# Patient Record
Sex: MALE | Race: OTHER | Employment: UNEMPLOYED | URBAN - METROPOLITAN AREA
[De-identification: names, ages, dates, MRNs, and addresses within clinical notes are randomized per-mention and may not be internally consistent; named-entity substitution may affect disease eponyms.]

---

## 2017-01-02 ENCOUNTER — OFFICE VISIT (OUTPATIENT)
Dept: FAMILY MEDICINE CLINIC | Age: 42
End: 2017-01-02

## 2017-01-02 VITALS
WEIGHT: 159 LBS | TEMPERATURE: 97.5 F | DIASTOLIC BLOOD PRESSURE: 64 MMHG | HEART RATE: 57 BPM | SYSTOLIC BLOOD PRESSURE: 110 MMHG | BODY MASS INDEX: 24.24 KG/M2

## 2017-01-02 DIAGNOSIS — H60.93 OTITIS EXTERNA OF BOTH EARS, UNSPECIFIED CHRONICITY, UNSPECIFIED TYPE: Primary | ICD-10-CM

## 2017-01-02 RX ORDER — NEOMYCIN SULFATE, POLYMYXIN B SULFATE AND DEXAMETHASONE 3.5; 10000; 1 MG/ML; [USP'U]/ML; MG/ML
SUSPENSION/ DROPS OPHTHALMIC
Qty: 5 ML | Refills: 1 | Status: SHIPPED | OUTPATIENT
Start: 2017-01-02 | End: 2022-03-17

## 2017-01-02 NOTE — PATIENT INSTRUCTIONS
Oído de nadador: Instrucciones de cuidado - [ Swimmer's Ear: Care Instructions ]  Instrucciones de cuidado    El oído de nadador (otitis externa) es gita inflamación o infección del canal auditivo. Gris es el conducto que va del oído externo hasta el tímpano. Cualquier residuo de [place], arena u otros que entren al canal [place] y permanezcan allí pueden causar oído de nadador. Introducirse hisopos de algodón u otros objetos en el oído para limpiarlo también puede causar gris problema. El oído de nadador puede ser [employer]. Sherine se puede tratar [employer] y la infección con medicamentos. Usted debería sentirse mejor en algunos días. La atención de seguimiento es gita parte clave de sahu tratamiento y seguridad. Asegúrese de hacer y acudir a todas las citas, y llame a sahu médico si está teniendo problemas. También es gita buena idea saber los resultados de los exámenes y mantener gita lista de los medicamentos que yosvany. ¿Cómo puede cuidarse en el hogar? Limpieza y cuidado  · Aplíquese las gotas antibióticas karen se lo indique el médico.  · No utilice gotas para los oídos (que no mumtaz gotas antibióticas) ni ninguna otra cosa dentro de los oídos a menos que sahu médico se lo haya indicado. · Evite que le entre agua en el oído hasta que pase el Grand-Leez. Utilice un algodón cubierto con un poco de vaselina karen tapón. No use tapones de plástico.  · Utilice un secador de pelo a baja temperatura para secarse el oído con cuidado después de ducharse. · Para aliviar el dolor, póngase gita toallita tibia [place]. · [employer] analgésicos (medicamentos para el dolor) exactamente según las indicaciones. ¨ Si el médico le recetó un analgésico, tómelo según las indicaciones. ¨ Si no está tomando un analgésico recetado, pregúntele a sahu médico si puede doreen fabrice de [employer]. Aplicación de gotas para los oídos  · [employer] gotas a la temperatura corporal haciendo rodar el recipiente [employer].  O puede ponerlo en gita taza de agua tibia mel algunos minutos. · Acuéstese, con el oído [place]. · Póngase gotas dentro del oído. Siga las instrucciones de sahu médico (o las indicaciones de la etiqueta) para saber cuántas gotas usar. Mueva sahu oreja con suavidad o tire de víctor [place] y [place] atrás para ayudar a que las gotas entren en el oído. · Es importante mantener el líquido en el canal auditivo por entre 3 y 5 minutos. ¿Cuándo debe pedir ayuda? Llame a sahu médico ahora mismo o busque atención médica inmediata si:  · Tiene fiebre nueva o más izzy. · Tiene nuevo o mayor dolor, hinchazón, calor o enrojecimiento alrededor o detrás de la [facility]. · Sahu oído presenta secreción de pus o valery nueva o que está aumentando. Preste especial atención a los cambios en sahu shaw y asegúrese de comunicarse con sahu médico si:  · No está mejorando después de 2 días (48 horas). ¿Dónde puede encontrar más información en inglés? Jeny Sousa a [email or web address]-fahad.info/. Cleve Camacho O924 en la búsqueda para aprender más acerca de \"Oído de nadador: Instrucciones de cuidado - [ Swimmer's Ear: Care Instructions ]. \"  Revisado: 29 julio, 2016  Versión del contenido: 11.1  © 7757-9298 Healthwise, Incorporated. Las instrucciones de cuidado fueron adaptadas bajo licencia por [employer] (which disclaims liability or warranty for this information). Si usted tiene [place] afección médica o sobre estas instrucciones, siempre pregunte a sahu profesional de shaw. Healthwise, Incorporated niega toda garantía o responsabilidad por sahu uso de esta información.

## 2017-01-02 NOTE — PROGRESS NOTES
Coordination of Care  1. Have you been to the ER, urgent care clinic since your last visit? Hospitalized since your last visit? No    2. Have you seen or consulted any other health care providers outside of the [address] [place] [address] since your last visit? Include any pap smears or colon screening. No    Medications  Medication Reconciliation Performed: no  Patient does not need refills     Learning Assessment Complete?  yes

## 2017-01-02 NOTE — MR AVS SNAPSHOT
Visit Information Martha Wren y Esther Personal Médico Departamento Teléfono del Dep. Número de visita 1/2/2017 11:00 AM [address], SID [place] [phone] Follow-up Instructions Return if symptoms worsen or fail to improve. Upcoming Health Maintenance Date Due DTaP/Tdap/Td series (1 - Tdap) 8/12/1997 Alergias  Review Complete El: 1/2/2017 Por: Salud Langford A partir del:  1/2/2017 No Known Allergies Vacunas actuales Revisadas el:  2/20/2016 Flakito Colin Influenza Vaccine (Quad) PF 12/15/2016, 2/20/2016 No revisadas esta visita You Were Diagnosed With   
  
 Glennette Faby Otitis externa of both ears, unspecified chronicity, unspecified type    -  Primary ICD-10-CM: H60.93 
ICD-9-CM: 380.10 Partes vitales PS Pulso Temperatura Peso (percentil de crecimiento) BMI ([facility]) Estatus de tabaquísmo 110/64 (BP 1 Location: Right arm, BP Patient Position: Sitting) (!) 57 97.5 °F (36.4 °C) (Oral) 159 lb (72.1 kg) 24.24 kg/m2 Never Smoker Historial de signos vitales BMI and BSA Data Body Mass Index Body Surface Area  
 24.24 kg/m 2 1.86 m 2 Danny [facility] Name Phone [address] [place] lista de medicamentos actualizada Lista actualizada el: 1/2/17 11:37 AM.  [place] use aguirre lista de medicamentos más reciente. naproxen 500 mg tablet También conocido karen:  NAPROSYN Take 1 Tab by mouth two (2) times daily (with meals). neomycin-polymyxin-dexamethasone 3.5mg/mL-10,000 unit/mL-0.1 % ophthalmic suspension También conocido karen:  Juma Antonio Use 1 drop in each EAR qid. This is being used in the ear due to the high cost of ear drops, this should be used for 5 days. Impresion de recetas Refills neomycin-polymyxin-dexamethasone (MAXITROL) ophthalmic suspension 1 Sig: Use 1 drop in each EAR qid. This is being used in the ear due to the high cost of ear drops, this should be used for 5 days. Class: Print Instrucciones de seguimiento Return if symptoms worsen or fail to improve. Instrucciones para el Paciente Oído de nadador: Instrucciones de cuidado - [ Swimmer's Ear: Care Instructions ] Instrucciones de cuidado El oído de nadador (otitis externa) es gita inflamación o infección del canal auditivo. Gris es el conducto que va del oído externo hasta el tímpano. Cualquier residuo de [place], arena u otros que entren al canal [place] y permanezcan allí pueden causar oído de nadador. Introducirse hisopos de algodón u otros objetos en el oído para limpiarlo también puede causar gris problema. El oído de nadador puede ser [employer]. Sherine se puede tratar [employer] y la infección con medicamentos. Usted debería sentirse mejor en algunos días. La atención de seguimiento es gita parte clave de sahu tratamiento y seguridad. Asegúrese de hacer y acudir a todas las citas, y llame a sahu médico si está teniendo problemas. También es gita buena idea saber los resultados de los exámenes y mantener gita lista de los medicamentos que yosvany. Cómo puede cuidarse en el hogar? Adriana Bustillo y Karley Jose · Aplíquese las gotas antibióticas karen se lo indique el médico. 
· No utilice gotas para los oídos (que no mumtaz gotas antibióticas) ni ninguna otra cosa dentro de los oídos a menos que sahu médico se lo haya indicado. · Evite que le entre agua en el oído hasta que pase el Grand-Leez. Utilice un algodón cubierto con un poco de mariferelina karen tapón. No use tapones de plástico. 
· Utilice un secador de pelo a baja temperatura para secarse el oído con cuidado después de ducharse. · Para aliviar el dolor, póngase gita toallita tibia [place]. · [employer] analgésicos (medicamentos para el dolor) exactamente según las indicaciones. ¨ Si el médico le recetó un analgésico, tómelo según las indicaciones. ¨ Si no está tomando un analgésico recetado, pregúntele a aguirre médico si puede doreen fabrice de [place]. Aplicación de gotas para los oídos · [place] las gotas a la temperatura corporal haciendo rodar el recipiente [employer]. O puede ponerlo en gita taza de agua tibia mel algunos minutos. · Acuéstese, con el oído [place]. · Póngase gotas dentro del oído. Siga las instrucciones de aguirre médico (o las indicaciones de la etiqueta) para saber cuántas gotas usar. Mueva aguirre oreja con suavidad o tire de víctor [place] atrás para ayudar a que las gotas entren en el oído. · Es importante mantener el líquido en el canal auditivo por entre 3 y 5 minutos. Cuándo debe pedir ayuda? Llame a aguirre médico ahora mismo o busque atención médica inmediata si: · Tiene fiebre nueva o más izzy. · Tiene nuevo o mayor dolor, hinchazón, calor o enrojecimiento alrededor o detrás de la [facility]. · Aguirre oído presenta secreción de pus o valery nueva o que está aumentando. Preste especial atención a los cambios en aguirre shaw y asegúrese de comunicarse con aguirre médico si: 
· No está mejorando después de 2 días (48 horas). Dónde puede encontrar más información en inglés? Doretha Rota a [email or web address]-fahad.info/. Vijaya Khan OViktoria7 en la búsqueda para aprender más acerca de \"Oído de nadador: Instrucciones de cuidado - [ Swimmer's Ear: Care Instructions ]. \" 
Revisado: 29 julio, 2016 Versión del contenido: 11.1 © 2506-6131 [employer]. Las instrucciones de cuidado fueron adaptadas bajo licencia por [employer] Help Connections (which disclaims liability or warranty for this information). Si usted tiene [place] afección médica o sobre estas instrucciones, siempre pregunte a aguirre profesional de shaw.  [employer] niega toda garantía o responsabilidad por sahu uso de esta información. Introducing [facility]! [facility] introduce portal paciente MyChart . Ahora se puede acceder a partes de sahu expediente médico, enviar por correo electrónico la oficina de sahu médico y solicitar renovaciones de medicamentos en línea. En sahu navegador de Internet , Willie Severe a [email or web address] Court clic en el usuario por Regan Grant? Noreen sharma aquí en la sesión Jaylan Fernandez. Verá la página de registro [place]. Ingrese sahu código de [employer] luisa y karen aparece a continuación. Usted no tendrá que [employer] código después de nikki completado el proceso de registro . Si usted no se inscribe antes de la fecha de caducidad , debe solicitar un nuevo código. · MyChart Código de acceso : [id number] Expires: 3/15/2017 12:13 PM 
 
Ingresa los últimos cuatro dígitos de sahu Número de Seguro Social ( xxxx ) y fecha de nacimiento ( dd / mm / aaaa ) karen se indica y court clic en Enviar. Usted será llevado a la siguiente página de registro . Crear un ID MyChart . Esta será sahu ID de inicio de sesión de MyChart y no puede ser [place] , por lo que pensar en gita que es [place] y fácil de recordar . Crear gita contraseña MyChart . Usted puede cambiar sahu contraseña en cualquier momento . Ingrese sahu Password Reset de preguntas y [place] . [place] se puede utilizar en un momento posterior si usted olvida sahu contraseña. Introduzca sahu dirección de correo electrónico . Breanna Llanos recibirá gita notificación por correo electrónico cuando la nueva información está disponible en MyChart . Vannessa Glory sharma en Registrarse. [place] veronica y descargar porciones de sahu expediente médico. 
Court zachary en el enlace de descarga del menú Resumen para descargar gita copia portátil de sahu información médica . Si tiene [employer] , por favor visite la sección de preguntas frecuentes del sitio web MyChart .  Recuerde, MyChart NO es que se utilizará para las [employer]. Para emergencias médicas , llame al 911 . Ahora disponible en sahu iPhone y Android ! Por favor proporcione gris resumen de la documentación de cuidado a sahu próximo proveedor. If you have any questions after today's visit, please call [phone].

## 2017-01-02 NOTE — PROGRESS NOTES
Assessment/Plan:    Shauna Soto was seen today for scheduled f/up:.    Diagnoses and all orders for this visit:    Otitis externa of both ears, unspecified chronicity, unspecified type  -     neomycin-polymyxin-dexamethasone (MAXITROL) ophthalmic suspension; Use 1 drop in each EAR qid. This is being used in the ear due to the high cost of ear drops, this should be used for 5 days. Explained to pt that due to the high cost of ear drops, that he would be using eye drops in his ears. I have written this clearly on the rx as well but in case the pharmacy still calls, this was intentional... Follow-up Disposition:  Return if symptoms worsen or fail to improve. TAYLOR New expressed understanding of this plan. An AVS was printed and given to the patient.      ----------------------------------------------------------------------    Chief Complaint   Patient presents with    Dizziness     f/u, vertigo, pt still experiencing itchy ear and noise in both ears. History of Present Illness:  Vertigo sxs have resolved since his last visit here. He has a new problem with itching in his external canals. No fever, no cough, no runny nose, no vision problems. He asks about dental resources and I have given him a list of the area dental sources. No past medical history on file. Current Outpatient Prescriptions   Medication Sig Dispense Refill    neomycin-polymyxin-dexamethasone (MAXITROL) ophthalmic suspension Use 1 drop in each EAR qid. This is being used in the ear due to the high cost of ear drops, this should be used for 5 days. 5 mL 1    naproxen (NAPROSYN) 500 mg tablet Take 1 Tab by mouth two (2) times daily (with meals). 61 Tab 1       No Known Allergies    Social History   Substance Use Topics    Smoking status: Never Smoker    Smokeless tobacco: None    Alcohol use No       No family history on file.     Physical Exam:     Visit Vitals    /64 (BP 1 Location: Right arm, BP Patient Position: Sitting)    Pulse (!) 57    Temp 97.5 °F (36.4 °C) (Oral)    Wt 159 lb (72.1 kg)    BMI 24.24 kg/m2   gen looks well, pleasant    A&Ox3  WDWN NAD  Respirations normal and non labored  HEENT- ivanna canals are red, TM's are intact w/out any injection, clear fluid bulge  Nares patent no discharge  OP clear w/out exudate  Neck is supple w/out LAD

## 2018-04-07 ENCOUNTER — CLINICAL SUPPORT (OUTPATIENT)
Dept: FAMILY MEDICINE CLINIC | Age: 43
End: 2018-04-07

## 2018-04-07 DIAGNOSIS — Z23 ENCOUNTER FOR IMMUNIZATION: Primary | ICD-10-CM

## 2018-04-07 NOTE — PROGRESS NOTES
Gave vaccine per protocol. Documented in [address]. Gave patient VIS information sheet and reviewed instructions regarding possible adverse side effects and allergic reactions. No adverse reaction noted at time of discharge.  Bay Dempsey RN

## 2019-12-19 ENCOUNTER — CLINICAL SUPPORT (OUTPATIENT)
Dept: FAMILY MEDICINE CLINIC | Age: 44
End: 2019-12-19

## 2019-12-19 DIAGNOSIS — Z23 ENCOUNTER FOR IMMUNIZATION: Primary | ICD-10-CM

## 2019-12-19 NOTE — PROGRESS NOTES
Carmine Gaytan  Requests flu vaccine. Denies fever and egg allergy. VIS information sheet given to patient. Explained possible s/e. Reviewed s/sx indicating need to be seen in ER. Patient had no adverse reaction at time of discharge. Entered into VIIS. GIVEN BY DAISY NICE, [facility], [place].  Flex Mansfield RN

## 2022-03-17 ENCOUNTER — HOSPITAL ENCOUNTER (OUTPATIENT)
Dept: LAB | Age: 47
Discharge: HOME | End: 2022-03-17

## 2022-03-17 ENCOUNTER — OFFICE VISIT (OUTPATIENT)
Dept: FAMILY MEDICINE CLINIC | Age: 47
End: 2022-03-17

## 2022-03-17 ENCOUNTER — HOSPITAL ENCOUNTER (OUTPATIENT)
Dept: GENERAL RADIOLOGY | Age: 47
Discharge: HOME | End: 2022-03-17

## 2022-03-17 VITALS
HEIGHT: 68 IN | WEIGHT: 157 LBS | OXYGEN SATURATION: 98 % | DIASTOLIC BLOOD PRESSURE: 50 MMHG | SYSTOLIC BLOOD PRESSURE: 102 MMHG | HEART RATE: 55 BPM | BODY MASS INDEX: 23.79 KG/M2 | TEMPERATURE: 98.1 F

## 2022-03-17 DIAGNOSIS — R06.02 SHORTNESS OF BREATH: ICD-10-CM

## 2022-03-17 DIAGNOSIS — Z12.11 SCREEN FOR COLON CANCER: ICD-10-CM

## 2022-03-17 DIAGNOSIS — G89.29 CHRONIC RIGHT SHOULDER PAIN: ICD-10-CM

## 2022-03-17 DIAGNOSIS — R63.0 LOSS OF APPETITE: ICD-10-CM

## 2022-03-17 DIAGNOSIS — R53.83 FATIGUE, UNSPECIFIED TYPE: ICD-10-CM

## 2022-03-17 DIAGNOSIS — M25.511 CHRONIC RIGHT SHOULDER PAIN: ICD-10-CM

## 2022-03-17 DIAGNOSIS — M79.601 RIGHT ARM PAIN: ICD-10-CM

## 2022-03-17 DIAGNOSIS — R53.83 FATIGUE, UNSPECIFIED TYPE: Primary | ICD-10-CM

## 2022-03-17 PROCEDURE — 80061 LIPID PANEL: CPT

## 2022-03-17 PROCEDURE — 71046 X-RAY EXAM CHEST 2 VIEWS: CPT

## 2022-03-17 PROCEDURE — 82306 VITAMIN D 25 HYDROXY: CPT

## 2022-03-17 PROCEDURE — 83036 HEMOGLOBIN GLYCOSYLATED A1C: CPT

## 2022-03-17 PROCEDURE — 84443 ASSAY THYROID STIM HORMONE: CPT

## 2022-03-17 PROCEDURE — 85025 COMPLETE CBC W/AUTO DIFF WBC: CPT

## 2022-03-17 PROCEDURE — 82607 VITAMIN B-12: CPT

## 2022-03-17 PROCEDURE — 80053 COMPREHEN METABOLIC PANEL: CPT

## 2022-03-17 PROCEDURE — 73030 X-RAY EXAM OF SHOULDER: CPT

## 2022-03-17 PROCEDURE — 99214 OFFICE O/P EST MOD 30 MIN: CPT | Performed by: FAMILY MEDICINE

## 2022-03-17 PROCEDURE — 87389 HIV-1 AG W/HIV-1&-2 AB AG IA: CPT

## 2022-03-17 PROCEDURE — 84153 ASSAY OF PSA TOTAL: CPT

## 2022-03-17 RX ORDER — DICLOFENAC SODIUM 10 MG/G
4 GEL TOPICAL 3 TIMES DAILY
Qty: 100 G | Refills: 2 | Status: SHIPPED | OUTPATIENT
Start: 2022-03-17

## 2022-03-17 NOTE — PROGRESS NOTES
Tc to the pt with Candice Jarrett as . The pt did not answer. He was called 2x. His VM box is not set up. No message could be left. A lab letter was created and sent to the [address] [place] [address].  Tomer Jaime RN

## 2022-03-17 NOTE — PROGRESS NOTES
Lore Jairo seen at d/c, given AVS and reviewed today's visit with patient along with instructions on when it is recommended to come back. I reviewed the medication list with the patient to ensure he knows how to and when to take his medications. Side effects, mechanisms of action and medication compliance were reiterated to ensure proper understanding. FIT testing kit provided to patient, label placed on collection tube, patient to collect and instructed patient to write in date that sample was collected on the label with  and full name, full instructions were reviewed with patient along with contents of kit and how to pack the sample, expressed to pt importance of dropping it off asap. Patient was assisted WITH INFORMATION ON HOW TO RECEIVE his imaging appointment for his X-ray: Chest and SHOULDER ordered today. IMAGING LOCATIONS SHEET GIVEN TO PATIENT. Good Rx coupons given to patient as well as instructions on how to use at the pharmacy. I have reviewed the provider's instructions with the patient, answering all questions to his satisfaction. Patient verbalized understanding.   Patrick Gray RN

## 2022-03-17 NOTE — PROGRESS NOTES
HISTORY OF PRESENT ILLNESS  Nathan Cai is a 40 y.o. male. HPI  Patient states he is having a problem with his health. He states he has back pains, and about 1 week ago had a right upper extremity pain. He couldn't move it well. He has felt his blood pressure is low and wanted to know if he was having a problem. Does not recall any injury. The arm feels better now. He always have low back pain. With right sided sciatica. The sciatica has improve. Has not taken any medications. He has been feeling tired for the past month. He feels there is trouble breathing as well. Has noticed some vision changes and a tic in the right upper eyelid. Also feels he has loss of appetite for the past month. Has not noticed weight loss. Patient did have COVID last year. This symptoms seem to have started after the infection. Review of Systems   Constitutional: Positive for malaise/fatigue. HENT: Negative for congestion and sinus pain. Respiratory: Negative for cough, sputum production, shortness of breath and wheezing. Cardiovascular: Negative for chest pain, palpitations and orthopnea. Musculoskeletal: Positive for back pain and joint pain. BP (!) 102/50 (BP 1 Location: Left upper arm, BP Patient Position: Sitting)   Pulse (!) 55   Temp 98.1 °F (36.7 °C) (Temporal)   Ht 5' 7.72\" (1.72 m)   Wt 157 lb (71.2 kg)   SpO2 98%   BMI 24.07 kg/m²   Physical Exam  Constitutional:       General: He is not in acute distress. HENT:      Head: Normocephalic. Right Ear: Tympanic membrane normal.      Left Ear: Tympanic membrane normal.   Cardiovascular:      Rate and Rhythm: Normal rate and regular rhythm. Pulses: Normal pulses. Heart sounds: Normal heart sounds. No murmur heard. Pulmonary:      Effort: Pulmonary effort is normal.      Breath sounds: Normal breath sounds. No wheezing or rhonchi. Abdominal:      General: Bowel sounds are normal. There is no distension. Palpations: Abdomen is soft. Tenderness: There is no abdominal tenderness. Hernia: No hernia is present. Musculoskeletal:         General: Tenderness present. Cervical back: Normal range of motion. No rigidity. Comments: Pain to ROM of right shoulder   Skin:     General: Skin is warm. Findings: No bruising or erythema. Neurological:      General: No focal deficit present. Mental Status: He is alert. Motor: No weakness. ASSESSMENT and PLAN  Diagnoses and all orders for this visit:    1. Fatigue, unspecified type  -     CBC WITH AUTOMATED DIFF; Future  -     METABOLIC PANEL, COMPREHENSIVE; Future  -     LIPID PANEL; Future  -     HEMOGLOBIN A1C WITH EAG; Future  -     TSH 3RD GENERATION; Future  -     PSA SCREENING (SCREENING); Future  -     HIV 1/2 AG/AB, 4TH GENERATION,W RFLX CONFIRM; Future  -     VITAMIN D, 25 HYDROXY; Future  -     VITAMIN B12; Future    2. Right arm pain    3. Loss of appetite    4. Shortness of breath  -     XR CHEST PA LAT; Future    5. Chronic right shoulder pain  -     XR SHOULDER RT AP/LAT MIN 2 V; Future  -     diclofenac (VOLTAREN) 1 % gel; Apply 4 g to affected area three (3) times daily. Right shoulder x 10 days    6. Screen for colon cancer  -     OCCULT BLOOD IMMUNOASSAY,DIAGNOSTIC; Future      40year old male with nonspecific complains, including fatigue, shortness of breath, right arm pain, loss of appetite  States this symptoms seem to have started after he had COVID-19 last year.   We will check labs and x rays,  It is possible he may be experiencing symptoms of long covid,  Vaccine strongly recommended

## 2022-03-18 ENCOUNTER — HOSPITAL ENCOUNTER (OUTPATIENT)
Dept: LAB | Age: 47
Discharge: HOME | End: 2022-03-18

## 2022-03-18 ENCOUNTER — LAB ONLY (OUTPATIENT)
Dept: FAMILY MEDICINE CLINIC | Age: 47
End: 2022-03-18

## 2022-03-18 DIAGNOSIS — Z12.11 SCREEN FOR COLON CANCER: ICD-10-CM

## 2022-03-18 LAB
25(OH)D3 SERPL-MCNC: 24.1 NG/ML (ref 30–100)
ALBUMIN SERPL-MCNC: 4 G/DL (ref 3.5–5)
ALBUMIN/GLOB SERPL: 1.1 {RATIO} (ref 1.1–2.2)
ALP SERPL-CCNC: 59 U/L (ref 45–117)
ALT SERPL-CCNC: 26 U/L (ref 12–78)
ANION GAP SERPL CALC-SCNC: 3 MMOL/L (ref 5–15)
AST SERPL-CCNC: 14 U/L (ref 15–37)
BASOPHILS # BLD: 0.1 K/UL (ref 0–0.1)
BASOPHILS NFR BLD: 1 % (ref 0–1)
BILIRUB SERPL-MCNC: 0.4 MG/DL (ref 0.2–1)
BUN SERPL-MCNC: 16 MG/DL (ref 6–20)
BUN/CREAT SERPL: 16 (ref 12–20)
CALCIUM SERPL-MCNC: 9.3 MG/DL (ref 8.5–10.1)
CHLORIDE SERPL-SCNC: 107 MMOL/L (ref 97–108)
CHOLEST SERPL-MCNC: 193 MG/DL
CO2 SERPL-SCNC: 30 MMOL/L (ref 21–32)
CREAT SERPL-MCNC: 1.03 MG/DL (ref 0.7–1.3)
DIFFERENTIAL METHOD BLD: NORMAL
EOSINOPHIL # BLD: 0 K/UL (ref 0–0.4)
EOSINOPHIL NFR BLD: 1 % (ref 0–7)
ERYTHROCYTE [DISTWIDTH] IN BLOOD BY AUTOMATED COUNT: 13.4 % (ref 11.5–14.5)
EST. AVERAGE GLUCOSE BLD GHB EST-MCNC: 111 MG/DL
GLOBULIN SER CALC-MCNC: 3.5 G/DL (ref 2–4)
GLUCOSE SERPL-MCNC: 95 MG/DL (ref 65–100)
HBA1C MFR BLD: 5.5 % (ref 4–5.6)
HCT VFR BLD AUTO: 41.5 % (ref 36.6–50.3)
HDLC SERPL-MCNC: 59 MG/DL
HDLC SERPL: 3.3 {RATIO} (ref 0–5)
HGB BLD-MCNC: 13 G/DL (ref 12.1–17)
HIV 1+2 AB+HIV1 P24 AG SERPL QL IA: NONREACTIVE
HIV12 RESULT COMMENT, HHIVC: NORMAL
IMM GRANULOCYTES # BLD AUTO: 0 K/UL (ref 0–0.04)
IMM GRANULOCYTES NFR BLD AUTO: 0 % (ref 0–0.5)
LDLC SERPL CALC-MCNC: 122 MG/DL (ref 0–100)
LYMPHOCYTES # BLD: 1.4 K/UL (ref 0.8–3.5)
LYMPHOCYTES NFR BLD: 35 % (ref 12–49)
MCH RBC QN AUTO: 27.8 PG (ref 26–34)
MCHC RBC AUTO-ENTMCNC: 31.3 G/DL (ref 30–36.5)
MCV RBC AUTO: 88.7 FL (ref 80–99)
MONOCYTES # BLD: 0.4 K/UL (ref 0–1)
MONOCYTES NFR BLD: 9 % (ref 5–13)
NEUTS SEG # BLD: 2.2 K/UL (ref 1.8–8)
NEUTS SEG NFR BLD: 54 % (ref 32–75)
NRBC # BLD: 0 K/UL (ref 0–0.01)
NRBC BLD-RTO: 0 PER 100 WBC
PLATELET # BLD AUTO: 239 K/UL (ref 150–400)
PMV BLD AUTO: 12.8 FL (ref 8.9–12.9)
POTASSIUM SERPL-SCNC: 4 MMOL/L (ref 3.5–5.1)
PROT SERPL-MCNC: 7.5 G/DL (ref 6.4–8.2)
PSA SERPL-MCNC: 0.3 NG/ML (ref 0.01–4)
RBC # BLD AUTO: 4.68 M/UL (ref 4.1–5.7)
SODIUM SERPL-SCNC: 140 MMOL/L (ref 136–145)
TRIGL SERPL-MCNC: 60 MG/DL (ref ?–150)
TSH SERPL DL<=0.05 MIU/L-ACNC: 0.98 UIU/ML (ref 0.36–3.74)
VIT B12 SERPL-MCNC: 709 PG/ML (ref 193–986)
VLDLC SERPL CALC-MCNC: 12 MG/DL
WBC # BLD AUTO: 4.1 K/UL (ref 4.1–11.1)

## 2022-03-18 PROCEDURE — 82274 ASSAY TEST FOR BLOOD FECAL: CPT

## 2022-03-21 NOTE — PROGRESS NOTES
T/C with the patient. I gave the patient the lab results per doctor following notes: \"Vitamin D levels is mildly decreased. Rest of results normal. May benefit from an over the counter vitamin D supplement of about 2000 units daily . \" Patient verbalized understanding

## 2022-03-21 NOTE — PROGRESS NOTES
Vitamin D levels is mildly decreased  Rest of results normal  May benefit from an over the counter vitamin D supplement of about 2000 units daily  Patient can be informed

## 2022-03-21 NOTE — PROGRESS NOTES
T/C with the patient. I gave the patent the Xray results per doctor following notes: \"Normal x ray patient can be informed. \" Patient verbalized understanding

## 2022-03-22 LAB — HEMOCCULT STL QL IA: NEGATIVE

## 2022-03-23 NOTE — PROGRESS NOTES
CMA made t/c to patient,  verified name and  w/ patient. CMA informed patient per provider instructions that Stool test negative for colon cancer   Repeat one in 1 year   Patient can be informed   Patient had no more questions for CMA. This has been fully explained to the patient, who indicates understanding.

## 2022-11-21 ENCOUNTER — IMMUNIZATION CLINIC (OUTPATIENT)
Dept: FAMILY MEDICINE CLINIC | Age: 47
End: 2022-11-21

## 2022-11-21 DIAGNOSIS — Z23 ENCOUNTER FOR IMMUNIZATION: Primary | ICD-10-CM

## 2022-11-21 PROCEDURE — 90686 IIV4 VACC NO PRSV 0.5 ML IM: CPT

## 2022-11-21 PROCEDURE — 90471 IMMUNIZATION ADMIN: CPT
